# Patient Record
Sex: FEMALE | Race: WHITE
[De-identification: names, ages, dates, MRNs, and addresses within clinical notes are randomized per-mention and may not be internally consistent; named-entity substitution may affect disease eponyms.]

---

## 2019-04-02 ENCOUNTER — HOSPITAL ENCOUNTER (OUTPATIENT)
Dept: HOSPITAL 63 - MAMMO | Age: 74
Discharge: HOME | End: 2019-04-02
Attending: SPECIALIST
Payer: MEDICARE

## 2019-04-02 DIAGNOSIS — Z12.31: Primary | ICD-10-CM

## 2019-04-02 PROCEDURE — 77067 SCR MAMMO BI INCL CAD: CPT

## 2019-04-02 PROCEDURE — 77063 BREAST TOMOSYNTHESIS BI: CPT

## 2019-04-02 NOTE — RAD
DATE: 4/2/2019



EXAM: MAMMO ALECIA SCREENING BILATERAL



HISTORY: Routine screening



COMPARISON: 7/29/2014



This study was interpreted with the benefit of Computerized Aided Detection

(CAD).



Breast Density: HETERO The breast parenchyma is heterogenously dense, which

could reduce sensitivity of mammography. Breast parenchyma level C.



FINDINGS: 2-D and 3-D tomosynthesis imaging was performed in CC and [facility]

projections.  No new or enlarging breast densities are seen.  Minimal benign

type calcification is present.  No suspicious microcalcifications have

developed.  



IMPRESSION: There is no mammographic evidence of malignancy in either breast.





BI-RADS CATEGORY: 2 BENIGN FINDING(S)



RECOMMENDED FOLLOW-UP: 12M 12 MONTH FOLLOW-UP



PQRS compliance statement: Patient information was entered into a reminder

system with a target due date     for the next mammogram.



Mammography is a sensitive method for finding small breast cancers, but it

does not detect them all and is not a substitute for careful clinical

examination.  A negative mammogram does not negate a clinically suspicious

finding and should not result in delay in biopsying a clinically suspicious

abnormality.



"Our facility is accredited by the [employer] Mammography

Program."